# Patient Record
(demographics unavailable — no encounter records)

---

## 2025-05-29 NOTE — PHYSICAL EXAM
[Normal Oropharynx] : the oropharynx was normal [No Respiratory Distress] : no respiratory distress  [No Accessory Muscle Use] : no accessory muscle use [Clear to Auscultation] : lungs were clear to auscultation bilaterally [Normal Rate] : normal rate  [Regular Rhythm] : with a regular rhythm [No Focal Deficits] : no focal deficits [Normal] : affect was normal and insight and judgment were intact [de-identified] : mild erythema or R ear canal

## 2025-05-29 NOTE — ASSESSMENT
[FreeTextEntry1] : 30F w/ Asthma is coming in for cough, pain along sinuses.  #Sinusitis -Symptoms most consistent w/ sinusitis  -Will treat w/ Augmentin 875-125mg BID for 10 days  -RTC if no improvement   #Asthma -Sent Albuterol inhaler as she often has asthma exacerbations when sick   RTC if no improvement

## 2025-07-22 NOTE — PHYSICAL EXAM
[TextEntry] : Constitutional: Well developed, well appearing, not in acute distress Head: Normocephalic, no lesions Eyes: PERRLA, conjunctiva is NL, clear Ear: Ear canal is normal, tympanic membrane is intact Nose: Nasal turbinates are NL Throat: Clear, no exudates, no lesions Neck: Supple, no masses Chest: Lungs are clear, no rales, no rhonchi, no wheezing Heart: Regular rate, no murmurs, no rubs, no gallops Breast: b/l breast is symmetric, areola and nipple is NL, breast is multinodular, dense. Axillary LN is NL b/l Abdomen: Soft, no tenderness, no masses, bowel sounds are normal : No CVAT Extremities: FROM, no deformities, no edema Musculoskeletal: has no tenderness of the spine, ROM is NL Skin: No rashes, Has a benign appearing nevus above the L eyebrow,  and above the L breast Neuro: AAO x 3, no focal neurological deficit. Psychiatric: oriented to person, place, and time and insight and judgment were intact.

## 2025-07-22 NOTE — PLAN
[FreeTextEntry1] : Ms. MONTSE ESCOTO 30 year  female  with a PMH  Asthma, hyperlipidemia  present to the office for a physical exam. Well adult exam is performed. Recommend  to do a blood test today, further management will depend on the blood test results.  Recommend  self breast exam monthly, bring report of the breast u/s report Bring report of the pap test Recommend  to bring immunization record F/u with a dermatologist, breast surgeon RTC to f/u in 2 wks. Patient is verbalized understanding

## 2025-07-22 NOTE — HEALTH RISK ASSESSMENT
[Very Good] : ~his/her~ current health as very good [Good] : ~his/her~  mood as  good [2 - 4 times a month (2 pts)] : 2-4 times a month (2 points) [1 or 2 (0 pts)] : 1 or 2 (0 points) [Never (0 pts)] : Never (0 points) [No] : In the past 12 months have you used drugs other than those required for medical reasons? No [No falls in past year] : Patient reported no falls in the past year [Little interest or pleasure doing things] : 1) Little interest or pleasure doing things [Feeling down, depressed, or hopeless] : 2) Feeling down, depressed, or hopeless [0] : 2) Feeling down, depressed, or hopeless: Not at all (0) [PHQ-2 Negative - No further assessment needed] : PHQ-2 Negative - No further assessment needed [Never] : Never [YES] : Yes [Patient reported PAP Smear was normal] : Patient reported PAP Smear was normal [HIV test declined] : HIV test declined [Hepatitis C test declined] : Hepatitis C test declined [# of Members in Household ___] :  household currently consist of [unfilled] member(s) [Employed] : employed [Graduate School] : graduate school [] :  [Sexually Active] : sexually active [Feels Safe at Home] : Feels safe at home [Fully functional (bathing, dressing, toileting, transferring, walking, feeding)] : Fully functional (bathing, dressing, toileting, transferring, walking, feeding) [Fully functional (using the telephone, shopping, preparing meals, housekeeping, doing laundry, using] : Fully functional and needs no help or supervision to perform IADLs (using the telephone, shopping, preparing meals, housekeeping, doing laundry, using transportation, managing medications and managing finances) [Smoke Detector] : smoke detector [Carbon Monoxide Detector] : carbon monoxide detector [Seat Belt] :  uses seat belt [Patient/Caregiver not ready to engage] : , patient/caregiver not ready to engage [Yes] : takes [None] : Patient does not have any barriers to medication adherence [Are there any unlocked firearms stored in your household?] : There are unlocked firearms in the household. [# Of Children ___] : has [unfilled] children [Safety elements used in home] : safety elements used in home [de-identified] : no [de-identified] : gyn, breast surgeon [Audit-CScore] : 2 [de-identified] : goes to the gym 6 times per week [de-identified] : regular [[id number]] : 0 [Are there any firearms stored in your household that are loaded?] : No firearms are stored in the household loaded. [Are there any children in your household?] : No children are in the household. [Has anyone in the household been feeling low/depressed/been struggling?] : No one in the household has been feeling low/depressed/been struggling. [Have you attended a firearm safety workshop or class?] : No firearm safety workshop or class has been attended. [Change in mental status noted] : No change in mental status noted [Language] : denies difficulty with language [Behavior] : denies difficulty with behavior [Learning/Retaining New Information] : denies difficulty learning/retaining new information [Handling Complex Tasks] : denies difficulty handling complex tasks [High Risk Behavior] : no high risk behavior [Reports changes in hearing] : Reports no changes in hearing [Reports changes in vision] : Reports no changes in vision [Reports changes in dental health] : Reports no changes in dental health [Sunscreen] : does not use sunscreen [MammogramComments] : B/l breast u/s  and mammogram on 05/2025 - needs to reopeat test in 6 months [PapSmearDate] : 04/2025 [de-identified] : with  [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

## 2025-07-22 NOTE — HISTORY OF PRESENT ILLNESS
[de-identified] : Ms. MONTSE ESCOTO 30 year  female  with a PMH  Asthma, hyperlipidemia present to the office for a physical exam .   Patient feels good, denies complaints at present time. As per patient  05/2025 had a mammo and breast u/s - after that was referred to see a breast surgeon who recommend to do an MRI of the breast(insurance  denies), recommend  to repeat test in October, 2025. Has an appointment with a breast surgeon in October